# Patient Record
Sex: FEMALE | Race: WHITE | NOT HISPANIC OR LATINO | Employment: OTHER | URBAN - METROPOLITAN AREA
[De-identification: names, ages, dates, MRNs, and addresses within clinical notes are randomized per-mention and may not be internally consistent; named-entity substitution may affect disease eponyms.]

---

## 2019-11-25 ENCOUNTER — OFFICE VISIT (OUTPATIENT)
Dept: GASTROENTEROLOGY | Facility: CLINIC | Age: 84
End: 2019-11-25
Payer: MEDICARE

## 2019-11-25 VITALS
WEIGHT: 115.2 LBS | HEART RATE: 77 BPM | BODY MASS INDEX: 19.19 KG/M2 | HEIGHT: 65 IN | SYSTOLIC BLOOD PRESSURE: 110 MMHG | DIASTOLIC BLOOD PRESSURE: 72 MMHG

## 2019-11-25 DIAGNOSIS — K80.20 GALLSTONES: Primary | ICD-10-CM

## 2019-11-25 DIAGNOSIS — K21.9 GASTROESOPHAGEAL REFLUX DISEASE WITHOUT ESOPHAGITIS: ICD-10-CM

## 2019-11-25 PROCEDURE — 99203 OFFICE O/P NEW LOW 30 MIN: CPT | Performed by: PHYSICIAN ASSISTANT

## 2019-11-25 RX ORDER — PANTOPRAZOLE SODIUM 40 MG/1
40 TABLET, DELAYED RELEASE ORAL 2 TIMES DAILY
Qty: 60 TABLET | Refills: 2 | Status: SHIPPED | OUTPATIENT
Start: 2019-11-25

## 2019-11-25 RX ORDER — ALBUTEROL SULFATE 90 UG/1
AEROSOL, METERED RESPIRATORY (INHALATION)
Refills: 0 | COMMUNITY
Start: 2019-11-14

## 2019-11-25 RX ORDER — IPRATROPIUM BROMIDE AND ALBUTEROL SULFATE 2.5; .5 MG/3ML; MG/3ML
SOLUTION RESPIRATORY (INHALATION)
Refills: 0 | COMMUNITY
Start: 2019-09-19

## 2019-11-25 RX ORDER — PREDNISONE 10 MG/1
10 TABLET ORAL DAILY
Refills: 0 | COMMUNITY
Start: 2019-09-04

## 2019-11-25 RX ORDER — FAMOTIDINE 20 MG/1
TABLET, FILM COATED ORAL
Refills: 0 | COMMUNITY
Start: 2019-11-22

## 2019-11-25 RX ORDER — FLUTICASONE FUROATE, UMECLIDINIUM BROMIDE AND VILANTEROL TRIFENATATE 100; 62.5; 25 UG/1; UG/1; UG/1
POWDER RESPIRATORY (INHALATION)
Refills: 0 | COMMUNITY
Start: 2019-11-14

## 2019-11-25 RX ORDER — SUCRALFATE 1 G/1
TABLET ORAL
Refills: 0 | COMMUNITY
Start: 2019-11-22

## 2019-11-25 RX ORDER — PREDNISONE 1 MG/1
5 TABLET ORAL DAILY
Refills: 0 | COMMUNITY
Start: 2019-10-08

## 2019-11-25 NOTE — LETTER
November 25, 2019     MD YOHANA Bryant [facility]  [address] [address]    Patient: Tracy Lazo   YOB: 1931   Date of Visit: 11/25/2019       Dear Dr Ruthy Oquendo: Thank you for referring Tracy Lazo to me for evaluation  Below are my notes for this consultation  If you have questions, please do not hesitate to call me  I look forward to following your patient along with you  Sincerely,        Juventino Foley PA-C        CC: No Recipients  Tyler Huitron  11/25/2019  4:18 PM  Sign at close encounter  [facility] - Outpatient Consultation  Tracy Lazo 80 y o  female MRN: [id number]  Encounter: [id number]          ASSESSMENT AND PLAN:      1  Gallstones  I am not convinced the patient is suffering from symptomatic gallstone disease due to the fact the pain that she is having is on the left side  Will order MRI with contrast/MRCP  If MRI/MRCP positive will plan for ERCP  2  Gastroesophageal reflux disease without esophagitis  Will stop famotidine  Will start pantoprazole 40 mg b i d   Patient will complete Carafate  I have explained the patient and patient's daughter that I would prefer to hold off on any endoscopic evaluation or surgical intervention if needed  ______________________________________________________________________    HPI:   70-year-old female who is here for evaluation of left-sided chest/rib pain  Patient reports that last Thursday she started with an acute onset of left-sided pain radiating from her left ribs up into her left shoulder blade  She reports she overall was not feeling good on Friday  She reports that she did go to the emergency room for evaluation  She was told in the emergency department that she does have gallstones  Patient also does suffer from chronic lung disease  She most recently was taking prednisone therapy  She reports that the pain returned again last evening    Patient is responding to Motrin  She reports that she had a similar pain to this several years ago but it was on the right side  She did see a surgeon regarding gallstones at that time and they told her to stop eating fried chicken  In the emergency room after CT scan was completed she did have an ultrasound that did show multiple layering gallstones within the gallbladder  At the present time the patient denies any nausea or vomiting  She denies any dark urine or yogi-colored stools  Patient CBD on ultrasound was normal and liver enzymes were normal   Patient's daughter reports that the day before the pain started she did suffer from heartburn and regurgitation  She was given a prescription for Carafate as well as famotidine by the emergency room  She does report that she had chills last week but nothing so far this week  REVIEW OF SYSTEMS:    CONSTITUTIONAL: Denies any fever, chills, rigors, and weight loss  HEENT: No earache or tinnitus  Denies hearing loss or visual disturbances  CARDIOVASCULAR: No chest pain or palpitations  RESPIRATORY: Denies any cough, hemoptysis, shortness of breath or dyspnea on exertion  GASTROINTESTINAL: As noted in the History of Present Illness  GENITOURINARY: No problems with urination  Denies any hematuria or dysuria  NEUROLOGIC: No dizziness or vertigo, denies headaches  MUSCULOSKELETAL: Denies any muscle or joint pain  SKIN: Denies skin rashes or itching  ENDOCRINE: Denies excessive thirst  Denies intolerance to heat or cold  PSYCHOSOCIAL: Denies depression or anxiety  Denies any recent memory loss         Historical Information   Past Medical History:   Diagnosis Date    COPD (chronic obstructive pulmonary disease) ([facility] [address] )      Past Surgical History:   Procedure Laterality Date    APPENDECTOMY      TONSILLECTOMY      TOTAL HIP ARTHROPLASTY       Social History   Social History     Substance and Sexual Activity   Alcohol Use Yes     Social History     Substance and Sexual Activity   Drug Use Never     Social History     Tobacco Use   Smoking Status Former Smoker   Smokeless Tobacco Never Used     History reviewed  No pertinent family history  Meds/Allergies       Current Outpatient Medications:     albuterol (PROVENTIL HFA,VENTOLIN HFA) 90 mcg/act inhaler    famotidine (PEPCID) 20 mg tablet    ipratropium-albuterol (DUO-NEB) 0 5-2 5 mg/3 mL nebulizer solution    predniSONE 10 mg tablet    predniSONE 5 mg tablet    sucralfate (CARAFATE) 1 g tablet    TRELEGY ELLIPTA 100-62 5-25 MCG/INH inhaler    pantoprazole (PROTONIX) 40 mg tablet    No Known Allergies        Objective     Blood pressure 110/72, pulse 77, height 5' 5" (1 651 m), weight 52 3 kg (115 lb 3 2 oz)  Body mass index is 19 17 kg/m²  PHYSICAL EXAM:      General Appearance:   Alert, cooperative, no distress   HEENT:   Normocephalic, atraumatic, anicteric      Neck:  Supple, symmetrical, trachea midline   Lungs:   Clear to auscultation bilaterally; no rales, rhonchi or wheezing; respirations unlabored    Heart[de-identified]   Regular rate and rhythm; no murmur, rub, or gallop  Abdomen:   Soft, non-tender, non-distended; normal bowel sounds; no masses, no organomegaly    Genitalia:   Deferred    Rectal:   Deferred    Extremities:  No cyanosis, clubbing or edema    Pulses:  2+ and symmetric    Skin:  No jaundice, rashes, or lesions    Lymph nodes:  No palpable cervical lymphadenopathy        Lab Results:   No results found for any previous visit  Radiology Results:   No results found

## 2019-11-25 NOTE — PROGRESS NOTES
[facility] - Outpatient Consultation  Kyrie Mendoza 80 y o  female MRN: [id number]  Encounter: [id number]          ASSESSMENT AND PLAN:      1  Gallstones  I am not convinced the patient is suffering from symptomatic gallstone disease due to the fact the pain that she is having is on the left side  Will order MRI with contrast/MRCP  If MRI/MRCP positive will plan for ERCP  2  Gastroesophageal reflux disease without esophagitis  Will stop famotidine  Will start pantoprazole 40 mg b i d   Patient will complete Carafate  I have explained the patient and patient's daughter that I would prefer to hold off on any endoscopic evaluation or surgical intervention if needed  ______________________________________________________________________    HPI:   75-year-old female who is here for evaluation of left-sided chest/rib pain  Patient reports that last Thursday she started with an acute onset of left-sided pain radiating from her left ribs up into her left shoulder blade  She reports she overall was not feeling good on Friday  She reports that she did go to the emergency room for evaluation  She was told in the emergency department that she does have gallstones  Patient also does suffer from chronic lung disease  She most recently was taking prednisone therapy  She reports that the pain returned again last evening  Patient is responding to Motrin  She reports that she had a similar pain to this several years ago but it was on the right side  She did see a surgeon regarding gallstones at that time and they told her to stop eating fried chicken  In the emergency room after CT scan was completed she did have an ultrasound that did show multiple layering gallstones within the gallbladder  At the present time the patient denies any nausea or vomiting  She denies any dark urine or yogi-colored stools    Patient CBD on ultrasound was normal and liver enzymes were normal   Patient's daughter reports that the day before the pain started she did suffer from heartburn and regurgitation  She was given a prescription for Carafate as well as famotidine by the emergency room  She does report that she had chills last week but nothing so far this week  REVIEW OF SYSTEMS:    CONSTITUTIONAL: Denies any fever, chills, rigors, and weight loss  HEENT: No earache or tinnitus  Denies hearing loss or visual disturbances  CARDIOVASCULAR: No chest pain or palpitations  RESPIRATORY: Denies any cough, hemoptysis, shortness of breath or dyspnea on exertion  GASTROINTESTINAL: As noted in the History of Present Illness  GENITOURINARY: No problems with urination  Denies any hematuria or dysuria  NEUROLOGIC: No dizziness or vertigo, denies headaches  MUSCULOSKELETAL: Denies any muscle or joint pain  SKIN: Denies skin rashes or itching  ENDOCRINE: Denies excessive thirst  Denies intolerance to heat or cold  PSYCHOSOCIAL: Denies depression or anxiety  Denies any recent memory loss  Historical Information   Past Medical History:   Diagnosis Date    COPD (chronic obstructive pulmonary disease) ([facility] [address] )      Past Surgical History:   Procedure Laterality Date    APPENDECTOMY      TONSILLECTOMY      TOTAL HIP ARTHROPLASTY       Social History   Social History     Substance and Sexual Activity   Alcohol Use Yes     Social History     Substance and Sexual Activity   Drug Use Never     Social History     Tobacco Use   Smoking Status Former Smoker   Smokeless Tobacco Never Used     History reviewed  No pertinent family history      Meds/Allergies       Current Outpatient Medications:     albuterol (PROVENTIL HFA,VENTOLIN HFA) 90 mcg/act inhaler    famotidine (PEPCID) 20 mg tablet    ipratropium-albuterol (DUO-NEB) 0 5-2 5 mg/3 mL nebulizer solution    predniSONE 10 mg tablet    predniSONE 5 mg tablet    sucralfate (CARAFATE) 1 g tablet    TRELEGY ELLIPTA 100-62 5-25 MCG/INH inhaler   pantoprazole (PROTONIX) 40 mg tablet    No Known Allergies        Objective     Blood pressure 110/72, pulse 77, height 5' 5" (1 651 m), weight 52 3 kg (115 lb 3 2 oz)  Body mass index is 19 17 kg/m²  PHYSICAL EXAM:      General Appearance:   Alert, cooperative, no distress   HEENT:   Normocephalic, atraumatic, anicteric      Neck:  Supple, symmetrical, trachea midline   Lungs:   Clear to auscultation bilaterally; no rales, rhonchi or wheezing; respirations unlabored    Heart[de-identified]   Regular rate and rhythm; no murmur, rub, or gallop  Abdomen:   Soft, non-tender, non-distended; normal bowel sounds; no masses, no organomegaly    Genitalia:   Deferred    Rectal:   Deferred    Extremities:  No cyanosis, clubbing or edema    Pulses:  2+ and symmetric    Skin:  No jaundice, rashes, or lesions    Lymph nodes:  No palpable cervical lymphadenopathy        Lab Results:   No results found for any previous visit  Radiology Results:   No results found

## 2019-11-25 NOTE — PATIENT INSTRUCTIONS
Gallstones   WHAT YOU NEED TO KNOW:   Gallstones, also called cholelithiasis, are hard substances that form in your gallbladder or bile duct  Your gallbladder and bile duct are located on the right side of your abdomen, near your liver  Your gallbladder stores bile, which helps break down the fat that you eat  Your gallbladder also helps remove certain chemicals from your body  DISCHARGE INSTRUCTIONS:   Medicines:   · Prescription pain medicine  may be given  Ask your healthcare provider how to take this medicine safely  · Take your medicine as directed  Contact your healthcare provider if you think your medicine is not helping or if you have side effects  Tell him if you are allergic to any medicine  Keep a list of the medicines, vitamins, and herbs you take  Include the amounts, and when and why you take them  Bring the list or the pill bottles to follow-up visits  Carry your medicine list with you in case of an emergency  Follow up with your healthcare provider as directed:  Write down your questions so you remember to ask them during your visits  Eat a variety of healthy foods: This may help you have more energy and heal faster  Healthy foods include fruit, vegetables, whole-grain breads, low-fat dairy products, beans, lean meat, and fish  Ask if you need to be on a special diet  Exercise as directed:  Talk to your healthcare provider about the best exercise plan for you  Exercise can help you lose weight and improve your health  Contact your healthcare provider if:   · You have nausea and are vomiting  · Your urine is dark  · You have yogi-colored bowel movements  · You have questions or concerns about your condition or care  Return to the emergency department if:   · You have a fever and chills  · Your skin or eyes turn yellow  · You have severe pain in your upper abdomen, just below the right ribcage    © 2017 [employer] Information is for End User's use only and may not be sold, redistributed or otherwise used for commercial purposes  All illustrations and images included in CareNotes® are the copyrighted property of A [employer] M [employer]  or [employer]  The above information is an  only  It is not intended as medical advice for individual conditions or treatments  Talk to your doctor, nurse or pharmacist before following any medical regimen to see if it is safe and effective for you

## 2020-03-15 ENCOUNTER — TELEPHONE (OUTPATIENT)
Dept: OTHER | Facility: OTHER | Age: 85
End: 2020-03-15

## 2020-03-16 ENCOUNTER — TELEPHONE (OUTPATIENT)
Dept: GASTROENTEROLOGY | Facility: CLINIC | Age: 85
End: 2020-03-16

## 2020-03-16 DIAGNOSIS — K80.20 GALLSTONES: Primary | ICD-10-CM

## 2020-03-16 NOTE — TELEPHONE ENCOUNTER
Nathaniel Justine - Patient's daughter, Radha Mos, called  Patient was in [facility] this week  Patient was told needs to have gall bladder removed  Would like to know who we recommend in the area  Please call Radha Stone at [phone] - Patient is not available between 11am - 12pm today

## 2020-03-23 ENCOUNTER — TELEPHONE (OUTPATIENT)
Dept: SURGERY | Facility: CLINIC | Age: 85
End: 2020-03-23

## 2020-03-23 NOTE — TELEPHONE ENCOUNTER
Pt's daughter called stating her mom needs her Gallbladder removed ASAP due to Gallstones  Not sure how emergent this may be, however her scans are on file under [facility] care everywhere  There is U/S, CT, MRI on file for you to review from DR ZENIA HAG[facility]  Please review and advise if urgent visit should be schedule or if pt can hold off  Also FYI pt has COPD and yes I informed the daughter that no electives are to be scheduled until further notice due to the corona virus  Pt's daughter insist on her mother being seen ASAP  I told her once you reviewed her scans you would advise me on further details and I will contact her on how we will proceed   Thank you

## 2020-03-24 ENCOUNTER — OFFICE VISIT (OUTPATIENT)
Dept: SURGERY | Facility: CLINIC | Age: 85
End: 2020-03-24
Payer: MEDICARE

## 2020-03-24 VITALS
TEMPERATURE: 98.7 F | HEIGHT: 65 IN | DIASTOLIC BLOOD PRESSURE: 62 MMHG | HEART RATE: 103 BPM | BODY MASS INDEX: 17.99 KG/M2 | WEIGHT: 108 LBS | SYSTOLIC BLOOD PRESSURE: 120 MMHG

## 2020-03-24 DIAGNOSIS — K80.10 CHRONIC CALCULOUS CHOLECYSTITIS: Primary | ICD-10-CM

## 2020-03-24 DIAGNOSIS — K80.20 GALLSTONES: ICD-10-CM

## 2020-03-24 PROCEDURE — 99204 OFFICE O/P NEW MOD 45 MIN: CPT | Performed by: SURGERY

## 2020-03-24 RX ORDER — SODIUM CHLORIDE 9 MG/ML
125 INJECTION, SOLUTION INTRAVENOUS
Status: CANCELLED | OUTPATIENT
Start: 2020-03-25 | End: 2020-03-26

## 2020-03-24 RX ORDER — HEPARIN SODIUM 5000 [USP'U]/ML
5000 INJECTION, SOLUTION INTRAVENOUS; SUBCUTANEOUS
Status: CANCELLED | OUTPATIENT
Start: 2020-03-25 | End: 2020-03-26

## 2020-03-24 NOTE — PROGRESS NOTES
Consult - General Surgery   Eulalia Hogan 80 y o  female MRN: [id number]  Unit/Bed#:  Encounter: [id number]    Assessment/Plan     Assessment:  Exacerbation of chronic calculus cholecystitis  COPD on oxygen at home during the night    Plan:  In light of the worsening of the symptomatology of gallstones I advised the patient to undergo laparoscopic cholecystectomy, possible open with cholangiograms in the near future  I discussed the operative procedure, risks, benefits and alternatives with the patient, she understood and agreed to proceed  History of Present Illness     HPI:  Eulalia Hogan is a 80 y o  female who presents to my office accompanied by her daughter for evaluation of symptomatic gallstones  The patient has been complaining of severe abdominal pain back in Mar 10, 2020, requiring emergency room visit  The patient was seen at DR ZENIA [facility]AG[facility] Emergency room and admitted to the hospital   After further workup the patient was found to have gallstones, ultrasound and MRCP was performed, see full reports below  The patient described the pain as 10/10, any epigastric and right upper quadrant associated with nausea without vomiting  The pain lasted almost entire day  The patient was workup in the hospital and she was advised to undergo laparoscopic cholecystectomy, unfortunately the patient waited over 6 hours for the surgeon to showed up, but this never happened  The patient and daughter signed against medical advise and she was brought here for further evaluation  She denies having any abdominal pain, she stated on low-fat diet  She denies having any fever, chills, nausea, vomiting, change in the color urine or stool  The patient has COPD and she uses oxygen at home during the night  She was apparently cleared by Pulmonary for surgery  Review of Systems   All other systems reviewed and are negative        Historical Information   Past Medical History:   Diagnosis Date    COPD (chronic obstructive pulmonary disease) ([facility] [address] )      Past Surgical History:   Procedure Laterality Date    APPENDECTOMY      TONSILLECTOMY      TOTAL HIP ARTHROPLASTY       Social History   Social History     Substance and Sexual Activity   Alcohol Use Yes     Social History     Substance and Sexual Activity   Drug Use Never     Social History     Tobacco Use   Smoking Status Former Smoker   Smokeless Tobacco Never Used     Family History: non-contributory    Meds/Allergies   all medications and allergies reviewed     Current Outpatient Medications:     albuterol (PROVENTIL HFA,VENTOLIN HFA) 90 mcg/act inhaler, , Disp: , Rfl: 0    famotidine (PEPCID) 20 mg tablet, TK 1 T PO BID FOR 10 DAYS, Disp: , Rfl: 0    ipratropium-albuterol (DUO-NEB) 0 5-2 5 mg/3 mL nebulizer solution, , Disp: , Rfl: 0    pantoprazole (PROTONIX) 40 mg tablet, Take 1 tablet (40 mg total) by mouth 2 (two) times a day, Disp: 60 tablet, Rfl: 2    predniSONE 10 mg tablet, Take 10 mg by mouth daily, Disp: , Rfl: 0    predniSONE 5 mg tablet, Take 5 mg by mouth daily, Disp: , Rfl: 0    sucralfate (CARAFATE) 1 g tablet, TK 1 T PO QID, Disp: , Rfl: 0    TRELEGY ELLIPTA 100-62 5-25 MCG/INH inhaler, , Disp: , Rfl: 0  No Known Allergies    Objective     Current Vitals:   Blood Pressure: 120/62 (03/24/20 1304)  Pulse: 103 (03/24/20 1304)  Temperature: 98 7 °F (37 1 °C) (03/24/20 1304)  Temp Source: Oral (03/24/20 1304)  Height: 5' 5" (165 1 cm) (03/24/20 1304)  Weight - Scale: 49 kg (108 lb) (03/24/20 1304)      Physical Exam   Constitutional: She is oriented to person, place, and time  She appears well-developed and well-nourished  No distress  Patient on a wheelchair because of shortness of breath secondary to COPD  She walks short distances   HENT:   Head: Normocephalic  Mouth/Throat: No oropharyngeal exudate  Eyes: Pupils are equal, round, and reactive to light  No scleral icterus  Cardiovascular: Normal rate and regular rhythm     No murmur heard   Pulmonary/Chest: Effort normal and breath sounds normal  No respiratory distress  Abdominal: Soft  She exhibits no mass  There is no tenderness  Neurological: She is alert and oriented to person, place, and time  No cranial nerve deficit  Skin: No rash noted  No erythema  Psychiatric: She has a normal mood and affect  Her behavior is normal    Nursing note and vitals reviewed  Lab Results:   metabolic panel(CMP)   Order: [id number]   (suggestion)  Information displayed in this report will not trend or trigger automated decision support  Ref Range & Units Value   Glucose 70 - 100 mg/dL 146High     BUN 7 - 18 mg/dL 12    Creatinine 0 50 - 1 00 mg/dL 0 87    Sodium 135 - 145 mmol/L 139    Potassium, Plasma 3 2 - 4 9 mmol/L 3 7    Chloride 95 - 110 mmol/L 107    Carbon Dioxide 21 - 32 mmol/L 26    Anion Gap 5 - 15 mmol/L 6    Calcium 8 5 - 10 1 mg/dL 8 1Low     Protein, Total 6 0 - 8 2 g/dL 5 7Low     Albumin 3 4 - 5 0 g/dL 3 1Low     Globulin 2 7 - 4 2 g/dL 2 6Low     A/G Ratio 1 00 - 2 00  1 19    AST (SGOT) 15 - 37 U/L 88High     ALT (SGPT) 12 - 59 U/L 207High     Alkaline Phosphatase 45 - 117 U/L 52    Total Bilirubin 0 0 - 1 1 mg/dL 0 6      CBC   Order: [id number]   (suggestion)  Information displayed in this report will not trend or trigger automated decision support  Ref Range & Units 3/13/20  5:02 AM   WBC 4 50 - 11 00 /nL 4  21Low     RBC 3 70 - 5 20 /pL 3 69Low     Hemoglobin 12 5 - 16 0 g/dL 11 8Low     Hematocrit 37 0 - 45 0 % 35 6Low     MCV 80 2 - 99 4 fl 96 5    MCH 27 0 - 33 0 pg 32 0    MCHC 32 0 - 36 0 g/dL 33 1    Platelets 119 - 559 /nL 269    RDW 11 5 - 14 1 % 13 5    MPV 9 0 - 13 0 fl 9 1    Nucleated RBC,Absolute 0 00 - 0 00 /nL 0 00    Nucleated RBCs % 0 0 - 0 0 /100WBC's 0 0    Specimen Collected: 03/13/20  5:02 AM Last Resulted: 03/13/20  5:28 AM   Received From: [address] [place] [address]  Result Received: 03/23/20  9:45 AM       Imaging: I have personally reviewed pertinent reports  No results found  EKG, Pathology, and Other Studies: I have personally reviewed pertinent films in PACS   1  Layering tiny gallstones  Distended gallbladder with pericholecystic fluid  Findings are of concern for acute cholecystitis  Recommend clinical correlation  Result Narrative   MRCP WITHOUT GADOLINIUM:    HISTORY: Right upper quadrant pain  PRIORS:  Ultrasound March 11, 2020    FINDINGS:    LIVER: Normal signal intensity   No measurable mass     PANCREAS:  Normal signal  No abnormal mass or inflammatory process  GALLBLADDER: Gertrude Galla are a number of tiny layering gallstones  The gallbladder is distended   There is fluid surrounding the gallbladder  CBD:  No dilatation   No filling defect  The intrahepatic bile ducts are normal   PANCREATIC DUCT:  Normal in course and caliber   No filling defect  OTHER FINDINGS: None  Other Result Information   Interface, Rad Results In - 03/12/2020  1:06 PM EDT  MRCP WITHOUT GADOLINIUM:    HISTORY: Right upper quadrant pain  PRIORS:  Ultrasound March 11, 2020    FINDINGS:    LIVER: Normal signal intensity  No measurable mass  PANCREAS:  Normal signal  No abnormal mass or inflammatory process  GALLBLADDER:  There are a number of tiny layering gallstones  The gallbladder is distended  There is fluid surrounding the gallbladder  CBD:  No dilatation  No filling defect  The intrahepatic bile ducts are normal   PANCREATIC DUCT:  Normal in course and caliber  No filling defect  OTHER FINDINGS: None  IMPRESSION:  1  Layering tiny gallstones  Distended gallbladder with pericholecystic fluid  Findings are of concern for acute cholecystitis  Recommend clinical correlation  1  Cholelithiasis  Pericholecystic fluid  Correlation recommended to exclude acute cholecystitis  No sonographic Manuel sign  Result Narrative   ULTRASOUND ABDOMEN COMPLETE:    HISTORY: abdominal pain;      PRIORS:  None      FINDINGS:   LIVER: Normal  15 2 cm in length  GALLBLADDER:   Cholelithiasis: Multiple small gallstones  Wall thickening: None  Pericholecystic fluid: Small amount of pericholecystic fluid  BILIARY SYSTEM: Common bile duct measures 7 mm   No intrahepatic biliary ductal dilatation  MANUEL'S SIGN: Negative  PANCREAS: Normal where visualized  SPLEEN: Not enlarged  3 0 x 6 5 x 3 0 cm    KIDNEYS:   Size: Symmetric  Right: [9 4 x 3 6 x 3 3 cm ] Left: 3 9 x 10 1 x 5 2 cm]  Calculus: None seen  Pelvocaliectasis: None  Mass/Cyst: None  AORTA AND IVC: Visualized portions normal caliber  ASCITES: None seen  Other Result Information   Interface, Rad Results In - 03/11/2020  5:51 PM EDT  ULTRASOUND ABDOMEN COMPLETE:    HISTORY: abdominal pain;      PRIORS:  None  FINDINGS:   LIVER: Normal  15 2 cm in length  GALLBLADDER:   Cholelithiasis: Multiple small gallstones  Wall thickening: None  Pericholecystic fluid: Small amount of pericholecystic fluid  BILIARY SYSTEM: Common bile duct measures 7 mm  No intrahepatic biliary ductal dilatation  MANUEL'S SIGN: Negative  PANCREAS: Normal where visualized  SPLEEN: Not enlarged  3 0 x 6 5 x 3 0 cm    KIDNEYS:   Size: Symmetric  Right: [9 4 x 3 6 x 3 3 cm ] Left: 3 9 x 10 1 x 5 2 cm]  Calculus: None seen  Pelvocaliectasis: None  Mass/Cyst: None  AORTA AND IVC: Visualized portions normal caliber  ASCITES: None seen  IMPRESSION:    1  Cholelithiasis  Pericholecystic fluid  Correlation recommended to exclude acute cholecystitis  No sonographic Manuel sign

## 2020-03-24 NOTE — PATIENT INSTRUCTIONS
Deep Vein Thrombosis Prevention   WHAT YOU NEED TO KNOW:   Deep vein thrombosis (DVT) is a blood clot that forms in a deep vein of the body  The deep veins in the legs, thighs, and hips are the most common sites for DVT  DVT can also occur in your arms  The clot prevents the normal flow of blood in the vein  The blood backs up and causes pain and swelling  The DVT can break into smaller pieces and travel to your lungs and cause a blockage called a pulmonary embolism  A pulmonary embolism can become life-threatening  DISCHARGE INSTRUCTIONS:   Call 911 for any of the following:   · You feel lightheaded, short of breath, and have chest pain  · You cough up blood  Return to the emergency department if:   · Your arm or leg feels warm, tender, and painful  It may look swollen and red  Contact your healthcare provider if:   · You have questions or concerns about your condition or care  Risk factors for DVT:  A DVT can happen to anybody, but certain things can increase your risk  You may be at higher risk if you have had DVT in the past  You may also be at risk if you have a family member who has had blood clots  The following conditions also increase your risk:  · Limited activity caused by bed rest, a leg cast, or sitting for long periods    · Injury to a deep vein, or surgery    · A blood disorder that makes your blood clot faster than normal, such as factor V Leiden mutation    · Age older than 60 years    · Use of hormone replacement therapy or some types of birth control medicine    · Pregnancy, and for 6 weeks after childbirth     · Cancer or heart failure     · A catheter placed in a large vein    · Smoking    · Obesity or varicose veins  Prevent DVT:   · Guidelines for everyone:      ¨ Maintain a healthy weight  Ask your healthcare provider how much you should weigh  Ask him to help you create a weight loss plan if you are overweight  ¨ Do not smoke    Nicotine and other chemicals in cigarettes and cigars can damage blood vessels and increase your risk for a DVT  Ask your healthcare provider for information if you currently smoke and need help to quit  E-cigarettes or smokeless tobacco still contain nicotine  Talk to your healthcare provider before you use these products  ¨ Move regularly if you sit for long periods of time  If you travel by car or work at a desk, move and stretch in your seat several times each hour  In an airplane, get up and walk every hour  Exercise your legs while sitting by raising and lowering your heels  Keep your toes on the floor while you do this  You can also raise and lower your toes while keeping your heels on the floor  Also tighten and release your leg muscles while sitting  ¨ Exercise regularly  to help increase your blood flow  Walking is a good low-impact exercise  Talk to your healthcare provider about the best exercise plan for you  · Guidelines for people at high risk for DVT:      ¨ Take blood thinner medicines as directed  Your healthcare provider may recommend blood thinners and other medicines to help prevent blood clots  ¨ Wear pressure stockings as directed  The stockings are tight and put pressure on your legs  This improves blood flow and helps prevent clots  Wear the stockings during the day  Do not wear them when you sleep  ¨ Elevate your legs  above the level of your heart as often as you can  This will help decrease swelling and pain  Prop your legs on pillows or blankets to keep them elevated comfortably  ¨ Get up and move as directed after surgery or an injury, or during an illness  Early and regular movement can help decrease your risk for DVT by helping to increase your blood flow  Ask your healthcare provider what type of activity you need and how often you should do it  ¨ Change body positions often if you are bedridden  Ask for help to change your position every 1 to 2 hours    Follow up with your healthcare provider as directed:  Write down your questions so you remember to ask them during your visits  © 2017 [employer] Information is for End User's use only and may not be sold, redistributed or otherwise used for commercial purposes  All illustrations and images included in CareNotes® are the copyrighted property of [employer]  or [employer]án [employer]  The above information is an  only  It is not intended as medical advice for individual conditions or treatments  Talk to your doctor, nurse or pharmacist before following any medical regimen to see if it is safe and effective for you  Laparoscopic Cholecystectomy   WHAT YOU NEED TO KNOW:   Laparoscopic cholecystectomy is surgery to remove your gallbladder  DISCHARGE INSTRUCTIONS:   Medicines: You may need any of the following:  · Prescription pain medicine  helps decrease pain  Do not wait until the pain is severe before you take this medicine  · NSAIDs  decrease swelling and pain  This medicine can be bought with or without a doctor's order  This medicine can cause stomach bleeding or kidney problems in certain people  If you take blood thinner medicine, always ask your healthcare provider if NSAIDs are safe for you  Read the medicine label and follow the directions on it before using this medicine  · Take your medicine as directed  Contact your healthcare provider if you think your medicine is not helping or if you have side effects  Tell him or her if you are allergic to any medicine  Keep a list of the medicines, vitamins, and herbs you take  Include the amounts, and when and why you take them  Bring the list or the pill bottles to follow-up visits  Carry your medicine list with you in case of an emergency  Follow up with your healthcare provider 2 weeks after surgery, or as directed:  Write down your questions so you remember to ask them during your visits    Wound care:  Care for your surgical wounds as directed  Keep the wounds clean and dry  You may take a shower the day after your surgery  What to eat after surgery:  Eat low-fat foods for 4 to 6 weeks while your body learns to digest fat without a gallbladder  Slowly increase the amount of fat that you eat  Drink plenty of liquids  Ask how much liquid to drink and which liquids are best for you  When to return to work and other activities: You may return to work or other activities as soon as your pain is controlled and you feel comfortable  For many people, this is 5 to 7 days after surgery  Contact your healthcare provider if:   · You have a fever over 101°F (38°C) or chills  · You have pain or nausea that is not relieved by medicine  · You have redness and swelling around your incisions, or blood or pus is leaking from your incisions  · You are constipated or have diarrhea  · Your skin or eyes are yellow, or your bowel movements are pale  · You have questions or concerns about your surgery, condition, or care  Seek care immediately or call 911 if:   · You cannot stop vomiting  · Your bowel movements are black or bloody  · You have pain in your abdomen and it is swollen or hard  · Your arm or leg feels warm, tender, and painful  It may look swollen and red  · You feel lightheaded, short of breath, and have chest pain  · You cough up blood  © 2017 [employer] Information is for End User's use only and may not be sold, redistributed or otherwise used for commercial purposes  All illustrations and images included in CareNotes® are the copyrighted property of [employer]  or Hernán Troy  The above information is an  only  It is not intended as medical advice for individual conditions or treatments  Talk to your doctor, nurse or pharmacist before following any medical regimen to see if it is safe and effective for you    Cholecystitis   WHAT YOU NEED TO KNOW:   Cholecystitis is inflammation of your gallbladder  Your gallbladder stores bile, which helps break down the fat that you eat  Your gallbladder also helps remove certain chemicals from your body  You may have a sudden, severe attack (acute cholecystitis) or several mild attacks (chronic cholecystitis)  DISCHARGE INSTRUCTIONS:   Call 911 for any of the following:   · You have chest pain or trouble breathing  Return to the emergency department if:   · You have severe pain in your abdomen  · You urinate less than usual   Contact your healthcare provider if:   · You have a fever or chills  · You have pain when you urinate  · Your skin or eyes turn yellow  · You have questions or concerns about your condition or care  Medicines: You may need any of the following:  · Antibiotics  treat a bacterial infection  · Prescription pain medicine  may be given  Ask your healthcare provider how to take this medicine safely  Some prescription pain medicines contain acetaminophen  Do not take other medicines that contain acetaminophen without talking to your healthcare provider  Too much acetaminophen may cause liver damage  Prescription pain medicine may cause constipation  Ask your healthcare provider how to prevent or treat constipation  · NSAIDs , such as ibuprofen, help decrease swelling, pain, and fever  This medicine is available with or without a doctor's order  NSAIDs can cause stomach bleeding or kidney problems in certain people  If you take blood thinner medicine, always ask your healthcare provider if NSAIDs are safe for you  Always read the medicine label and follow directions  · Take your medicine as directed  Contact your healthcare provider if you think your medicine is not helping or if you have side effects  Tell him of her if you are allergic to any medicine  Keep a list of the medicines, vitamins, and herbs you take  Include the amounts, and when and why you take them   Bring the list or the pill bottles to follow-up visits  Carry your medicine list with you in case of an emergency  Eat a variety of healthy foods: This may decrease your symptoms  Healthy foods include fruit, vegetables, whole-grain breads, low-fat dairy products, beans, lean meat, and fish  Ask if you need to be on a special diet  Follow up with your healthcare provider as directed:  Write down your questions so you remember to ask them during your visits  © 2017 [employer]  Information is for End User's use only and may not be sold, redistributed or otherwise used for commercial purposes  All illustrations and images included in CareNotes® are the copyrighted property of [employer]  or Hernán Woodrow  The above information is an  only  It is not intended as medical advice for individual conditions or treatments  Talk to your doctor, nurse or pharmacist before following any medical regimen to see if it is safe and effective for you  Low Fat Diet   WHAT YOU NEED TO KNOW:   What is a low-fat diet? A low-fat diet is an eating plan that is low in total fat, unhealthy fat, and cholesterol  You may need to follow a low-fat diet if you have trouble digesting or absorbing fat  You may also need to follow this diet if you have high cholesterol  You can also lower your cholesterol by increasing the amount of fiber in your diet  Soluble fiber is a type of fiber that helps to decrease cholesterol levels  What do I need to know about the different types of fat in food? · Limit unhealthy fats  A diet that is high in cholesterol, saturated fat, and trans fat may cause unhealthy cholesterol levels  Unhealthy cholesterol levels increase your risk of heart disease  ¨ Cholesterol:  Limit intake of cholesterol to less than 200 mg per day  Cholesterol is found in meat, eggs, and dairy  ¨ Saturated fat:  Limit saturated fat to less than 7% of your total daily calories   Ask your dietitian how many calories you need each day  Saturated fat is found in butter, cheese, ice cream, whole milk, and palm oil  Saturated fat is also found in meat, such as beef, pork, chicken skin, and processed meats  Processed meats include sausage, hot dogs, and bologna  ¨ Trans fat:  Avoid trans fat as much as possible  Trans fat is used in fried and baked foods  Foods that say trans fat free on the label may still have up to 0 5 grams of trans fat per serving  · Include healthy fats  Replace foods that are high in saturated and trans fat with foods high in healthy fats  This may help to decrease high cholesterol levels  ¨ Monounsaturated fats: These are found in avocados, nuts, and vegetable oils, such as olive, canola, and sunflower oil  ¨ Polyunsaturated fats: These can be found in vegetable oils, such as soybean or corn oil  Omega-3 fats can help to decrease the risk of heart disease  Omega-3 fats are found in fish, such as salmon, herring, trout, and tuna  Omega-3 fats can also be found in plant foods, such as walnuts, flaxseed, soybeans, and canola oil  What foods should I limit or avoid?    · Grains:      ¨ Snacks that are made with partially hydrogenated oils, such as chips, regular crackers, and butter-flavored popcorn    ¨ High-fat baked goods, such as biscuits, croissants, doughnuts, pies, cookies, and pastries    · Dairy:      ¨ Whole milk, 2% milk, and yogurt and ice cream made with whole milk    ¨ Half and half creamer, heavy cream, and whipping cream    ¨ Cheese, cream cheese, and sour cream    · Meats and proteins:      ¨ High-fat cuts of meat (T-bone steak, regular hamburger, and ribs)    ¨ Fried meat, poultry (turkey and chicken), and fish    ¨ Poultry (chicken and turkey) with skin    ¨ Cold cuts (salami or bologna), hot dogs, cannon, and sausage    ¨ Whole eggs and egg yolks    · Vegetables and fruits with added fat:      ¨ Fried vegetables or vegetables in butter or high-fat sauces, such as cream or cheese sauces    ¨ Fried fruit or fruit served with butter or cream    · Fats:      ¨ Butter, stick margarine, and shortening    ¨ Coconut, palm oil, and palm kernel oil  What foods should I include? · Grains:      ¨ Whole-grain breads, cereals, pasta, and brown rice    ¨ Low-fat crackers and pretzels    · Vegetables and fruits:      ¨ Fresh, frozen, or canned vegetables (no salt or low-sodium)    ¨ Fresh, frozen, dried, or canned fruit (canned in light syrup or fruit juice)    ¨ Avocado    · Low-fat dairy products:      ¨ Nonfat (skim) or 1% milk    ¨ Nonfat or low-fat cheese, yogurt, and cottage cheese    · Meats and proteins:      ¨ Chicken or turkey with no skin    ¨ Baked or broiled fish    ¨ Lean beef and pork (loin, round, extra lean hamburger)    ¨ Beans and peas, unsalted nuts, soy products    ¨ Egg whites and substitutes    ¨ Seeds and nuts    · Fats:      ¨ Unsaturated oil, such as canola, olive, peanut, soybean, or sunflower oil    ¨ Soft or liquid margarine and vegetable oil spread    ¨ Low-fat salad dressing  What are some other ways I can decrease fat? · Read food labels before you buy foods  Choose foods that have less than 30% of calories from fat  Choose low-fat or fat-free dairy products  Remember that fat free does not mean calorie free  These foods still contain calories, and too many calories can lead to weight gain  · Trim fat from meat and avoid fried food  Trim all visible fat from meat before you cook it  Remove the skin from poultry  Do not varghese meat, fish, or poultry  Bake, roast, boil, or broil these foods instead  Avoid fried foods  Eat a baked potato instead of [place] fries  Steam vegetables instead of sautéing them in butter  · Add less fat to foods  Use imitation cannon bits on salads and baked potatoes instead of regular cannon bits  Use fat-free or low-fat salad dressings instead of regular dressings   Use low-fat or nonfat butter-flavored topping instead of regular butter or margarine on popcorn and other foods  How can I decrease fat in recipes? Replace high-fat ingredients with low-fat or nonfat ones  This may cause baked goods to be drier than usual  You may need to use nonfat cooking spray on pans to prevent food from sticking  You also may need to change the amount of other ingredients, such as water, in the recipe  Try the following:  · Use low-fat or light margarine instead of regular margarine or shortening  · Use lean ground turkey breast or chicken, or lean ground beef (less than 5% fat) instead of hamburger  · Add 1 teaspoon of canola oil to 8 ounces of skim milk instead of using cream or half and half  · Use grated zucchini, carrots, or apples in breads instead of coconut  · Use blenderized, low-fat cottage cheese, plain tofu, or low-fat ricotta cheese instead of cream cheese  · Use 1 egg white and 1 teaspoon of canola oil, or use ¼ cup (2 ounces) of fat-free egg substitute instead of a whole egg  · Replace half of the oil that is called for in a recipe with applesauce when you bake  Use 3 tablespoons of cocoa powder and 1 tablespoon of canola oil instead of a square of baking chocolate  How can I increase fiber? Eat enough high-fiber foods to get 20 to 30 grams of fiber every day  Slowly increase your fiber intake to avoid stomach cramps, gas, and other problems  · Eat 3 ounces of whole-grain foods each day  An ounce is about 1 slice of bread  Eat whole-grain breads, such as whole-wheat bread  Whole wheat, whole-wheat flour, or other whole grains should be listed as the first ingredient on the food label  Replace white flour with whole-grain flour or use half of each in recipes  Whole-grain flour is heavier than white flour, so you may have to add more yeast or baking powder  · Eat a high-fiber cereal for breakfast   Oatmeal is a good source of soluble fiber  Look for cereals that have bran or fiber in the name   Choose whole-grain products, such as brown rice, barley, and whole-wheat pasta  · Eat more beans, peas, and lentils  For example, add beans to soups or salads  Eat at least 5 cups of fruits and vegetables each day  Eat fruits and vegetables with the peel because the peel is high in fiber  CARE AGREEMENT:   You have the right to help plan your care  Discuss treatment options with your caregivers to decide what care you want to receive  You always have the right to refuse treatment  The above information is an  only  It is not intended as medical advice for individual conditions or treatments  Talk to your doctor, nurse or pharmacist before following any medical regimen to see if it is safe and effective for you  © 2017 [employer] Information is for End User's use only and may not be sold, redistributed or otherwise used for commercial purposes  All illustrations and images included in CareNotes® are the copyrighted property of [employer]  or [employer]án [employer]

## 2020-03-24 NOTE — PROGRESS NOTES
Assessment/Plan:    No problem-specific Assessment & Plan notes found for this encounter  Subjective: Gallbladder     Patient ID: Kia Mercado is a 80 y o  female      HPI        Review of Systems      Objective:      /62 (BP Location: Right arm, Patient Position: Sitting, Cuff Size: Standard)   Pulse 103   Temp 98 7 °F (37 1 °C) (Oral)   Ht 5' 5" (1 651 m)   Wt 49 kg (108 lb)   Breastfeeding No   BMI 17 97 kg/m²          Physical Exam

## 2020-03-30 ENCOUNTER — TELEPHONE (OUTPATIENT)
Dept: SURGERY | Facility: CLINIC | Age: 85
End: 2020-03-30

## 2020-03-30 NOTE — TELEPHONE ENCOUNTER
Pt was scheduled for 01/10/20 but due to cov-19 pt's daughter agreed to r/s for 05/08/20  She stated the pt has no symptoms or complaints at the moment  However she mentioned that you stated she may need cardiac clearance  If this is so please let me know so I larry schedule her

## 2020-03-31 NOTE — TELEPHONE ENCOUNTER
Will contact cardiology and schedule for clearance  However she has rescheduled her procedure until the month of May due to Covid-19

## 2020-04-01 ENCOUNTER — OFFICE VISIT (OUTPATIENT)
Dept: CARDIOLOGY CLINIC | Facility: CLINIC | Age: 85
End: 2020-04-01
Payer: MEDICARE

## 2020-04-01 VITALS
SYSTOLIC BLOOD PRESSURE: 124 MMHG | BODY MASS INDEX: 17.83 KG/M2 | DIASTOLIC BLOOD PRESSURE: 66 MMHG | HEART RATE: 70 BPM | OXYGEN SATURATION: 97 % | WEIGHT: 107 LBS | HEIGHT: 65 IN

## 2020-04-01 DIAGNOSIS — Z01.818 PRE-OPERATIVE CLEARANCE: Primary | ICD-10-CM

## 2020-04-01 DIAGNOSIS — R06.02 SHORTNESS OF BREATH ON EXERTION: ICD-10-CM

## 2020-04-01 DIAGNOSIS — I45.2 BIFASCICULAR BLOCK: ICD-10-CM

## 2020-04-01 PROCEDURE — 99204 OFFICE O/P NEW MOD 45 MIN: CPT | Performed by: INTERNAL MEDICINE

## 2020-04-01 PROCEDURE — 93000 ELECTROCARDIOGRAM COMPLETE: CPT | Performed by: INTERNAL MEDICINE

## 2020-04-01 RX ORDER — CHOLECALCIFEROL (VITAMIN D3) 50 MCG
CAPSULE ORAL
COMMUNITY

## 2020-04-01 RX ORDER — ARFORMOTEROL TARTRATE 15 UG/2ML
SOLUTION RESPIRATORY (INHALATION)
COMMUNITY
Start: 2020-02-25

## 2020-04-01 RX ORDER — EPINEPHRINE 0.3 MG/.3ML
INJECTION SUBCUTANEOUS
COMMUNITY
Start: 2020-03-20

## 2020-04-01 RX ORDER — POTASSIUM CHLORIDE 750 MG/1
TABLET, EXTENDED RELEASE ORAL
COMMUNITY
Start: 2020-03-02

## 2020-04-01 RX ORDER — FUROSEMIDE 20 MG/1
TABLET ORAL
COMMUNITY
Start: 2020-03-02

## 2020-04-01 RX ORDER — ALBUTEROL SULFATE 2.5 MG/3ML
SOLUTION RESPIRATORY (INHALATION)
COMMUNITY
Start: 2020-03-21

## 2020-04-01 RX ORDER — BUDESONIDE 0.5 MG/2ML
INHALANT ORAL
COMMUNITY
Start: 2020-02-24

## 2020-04-01 RX ORDER — ALENDRONATE SODIUM 40 MG/1
TABLET ORAL
COMMUNITY
Start: 2020-03-02

## 2020-04-01 RX ORDER — ALBUTEROL SULFATE 2.5 MG/3ML
2.5 SOLUTION RESPIRATORY (INHALATION) 4 TIMES DAILY
COMMUNITY

## 2020-04-01 RX ORDER — LORATADINE 10 MG/1
10 TABLET ORAL DAILY
COMMUNITY

## 2020-04-01 RX ORDER — UBIDECARENONE 10 MG
10 CAPSULE ORAL DAILY
COMMUNITY

## 2020-04-01 RX ORDER — LANOLIN ALCOHOL/MO/W.PET/CERES
1000 CREAM (GRAM) TOPICAL DAILY
COMMUNITY

## 2020-04-01 RX ORDER — FLUTICASONE PROPIONATE 50 MCG
1 SPRAY, SUSPENSION (ML) NASAL
COMMUNITY
Start: 2017-09-01

## 2020-04-01 RX ORDER — SACCHAROMYCES BOULARDII 250 MG
500 CAPSULE ORAL DAILY
COMMUNITY

## 2020-05-01 ENCOUNTER — HOSPITAL ENCOUNTER (OUTPATIENT)
Dept: NON INVASIVE DIAGNOSTICS | Facility: CLINIC | Age: 85
Discharge: HOME | End: 2020-05-01
Payer: MEDICARE

## 2020-05-01 DIAGNOSIS — R06.02 SHORTNESS OF BREATH ON EXERTION: ICD-10-CM

## 2020-05-01 PROCEDURE — 93306 TTE W/DOPPLER COMPLETE: CPT

## 2020-05-01 PROCEDURE — 93306 TTE W/DOPPLER COMPLETE: CPT | Performed by: INTERNAL MEDICINE

## 2020-06-02 DIAGNOSIS — Z01.818 PRE-OP TESTING: Primary | ICD-10-CM

## 2020-06-08 ENCOUNTER — TELEPHONE (OUTPATIENT)
Dept: SURGERY | Facility: CLINIC | Age: 85
End: 2020-06-08